# Patient Record
Sex: FEMALE | Race: WHITE
[De-identification: names, ages, dates, MRNs, and addresses within clinical notes are randomized per-mention and may not be internally consistent; named-entity substitution may affect disease eponyms.]

---

## 2017-08-07 NOTE — OR
DATE OF PROCEDURE:  08/07/2017

 

PREOPERATIVE DIAGNOSIS:  2 cm crown of scalp and 2.5 cm right occipital scalp 

masses consistent with cysts.

 

POSTOPERATIVE DIAGNOSIS:  2.5 cm crown of scalp and 3 cm right occipital scalp 

masses.

 

PROCEDURE:  Excision of 2.5 cm crown of scalp and 3 cm right occipital scalp 
masses.



SURGEON:  Yonny Sherman MD

 

ANESTHESIA:  IV anesthesia with monitored anesthesia care.

 

INDICATIONS:  This 36-year-old white female has 2 masses in her scalp.  One is 
up in 

the crown and the other in the right occipital area.  She is here to have them 
excised.  I

counseled her for excision of these two lesions including risks and alternatives
, and she

gave her informed consent to proceed.  These ultimately were shown to measure 
2.5 cm 

for  the crown and 3 cm for the right occipital area.

 

DESCRIPTION OF PROCEDURE:  The patient was placed prone on the operating room 

table.   IV anesthesia was administered by the Anesthesia Service.  Some small 
amount 

of hair was excised from both mass areas.  The areas were prepped and draped in 
the 

usual sterile fashion.  Time-out was held.  Lidocaine 1% with epinephrine in a 
50:50 mix 

with 0.5% Marcaine was infiltrated about the crown mass first.  An elliptical 
incision was 

then made to remove this.  It is consistent with a cyst.  It measured 2.5 cm in 
diameter.  

The incision was closed with a running stitch of 3-0 Prolene.  The specimen was 
sent to 

Pathology.

 

Next, the right occipital mass was excised.  Lidocaine 1% with epinephrine in a 
50:50 mix

with 0.5% Marcaine was infiltrated about this mass.  This mass was then excised 
using an

elliptical incision.  The mass was noted to measure 3 cm in diameter.  This was 
also sent 

to the laboratory.  The incision was closed with a running stitch of 3-0 
Prolene with an

occasional vertical mattress stitch placed to achieve good wound edge 
approximation.

Bacitracin was applied to both incisions.  She tolerated the procedure well and 
was brought

to recovery room in good condition.

 

 

 

 

Yonny Sherman MD

DD:  08/07/2017 10:08:40

DT:  08/07/2017 13:43:29

Job #:  [id number]

MTDD

## 2019-07-30 ENCOUNTER — HOSPITAL ENCOUNTER (INPATIENT)
Dept: HOSPITAL 11 - JP.OBCHECK | Age: 39
LOS: 3 days | Discharge: HOME | End: 2019-08-02
Attending: ADVANCED PRACTICE MIDWIFE | Admitting: ADVANCED PRACTICE MIDWIFE
Payer: MEDICAID

## 2019-07-30 DIAGNOSIS — Z67.41: ICD-10-CM

## 2019-07-30 DIAGNOSIS — Z88.0: ICD-10-CM

## 2019-07-30 DIAGNOSIS — Z3A.40: ICD-10-CM

## 2019-07-30 DIAGNOSIS — O32.2XX0: ICD-10-CM

## 2019-07-30 DIAGNOSIS — O26.893: ICD-10-CM

## 2019-07-30 PROCEDURE — 10907ZC DRAINAGE OF AMNIOTIC FLUID, THERAPEUTIC FROM PRODUCTS OF CONCEPTION, VIA NATURAL OR ARTIFICIAL OPENING: ICD-10-PCS | Performed by: ADVANCED PRACTICE MIDWIFE

## 2019-07-30 RX ADMIN — OXYCODONE HYDROCHLORIDE AND ACETAMINOPHEN PRN TAB: 5; 325 TABLET ORAL at 18:06

## 2019-07-30 RX ADMIN — FENTANYL CITRATE PRN MCG: 50 INJECTION, SOLUTION INTRAMUSCULAR; INTRAVENOUS at 19:29

## 2019-07-30 RX ADMIN — KETOROLAC TROMETHAMINE SCH MG: 30 INJECTION, SOLUTION INTRAMUSCULAR at 16:59

## 2019-07-30 RX ADMIN — FENTANYL CITRATE PRN MCG: 50 INJECTION, SOLUTION INTRAMUSCULAR; INTRAVENOUS at 23:34

## 2019-07-30 RX ADMIN — FENTANYL CITRATE PRN MCG: 50 INJECTION, SOLUTION INTRAMUSCULAR; INTRAVENOUS at 21:01

## 2019-07-30 NOTE — PCM.PNLD
Labor Progress Note





- VS & Meds


Vital Signs: 


 Last Vital Signs











Temp  97.8 F   07/30/19 10:05


 


Pulse  69   07/30/19 10:05


 


Resp  16   07/30/19 10:05


 


BP  131/84   07/30/19 10:05


 


Pulse Ox  97   07/30/19 10:05











Active Medications: 


 Current Medications





Acetaminophen (Tylenol)  650 mg PO Q4H PRN


   PRN Reason: Pain (Mild 1-3) and fever


Oxytocin/Sodium Chloride (Pitocin In Ns 20 Units/1,000 Ml)  20 unit in 1,000 

mls @ 999 mls/hr IV ASDIRECTED ANDREW; Protocol


Vancomycin HCl 1 gm/ Sodium (Chloride)  250 mls @ 166.667 mls/hr IV Q12H ANDREW


   Last Admin: 07/30/19 13:25 Dose:  166.667 mls/hr


Sodium Chloride (Saline Flush)  10 ml FLUSH ASDIRECTED PRN


   PRN Reason: Keep Vein Open





Discontinued Medications





Terbutaline Sulfate (Brethine) Confirm Administered Dose 1 mg .ROUTE .STK-MED 

ONE


   Stop: 07/30/19 15:02


Terbutaline Sulfate (Brethine)  0.4 mg SUBCUT ONETIME ONE


   Stop: 07/30/19 15:05











- Uterine Contractions


Uterine Monitoring Mode: External [place]


Contraction Frequency (min): 1-9


Contraction Duration (sec): 60-90


Contraction Intensity: Moderate


Uterine Resting Tone: Soft





- Fetal Monitoring


Fetal Heart Rate (FHR) Variability: Moderate (6-25 bmp)





- Vaginal Exam


Dilation (cm): 4-5


Effacement (Percent): 50


Station: 0


Cervical Position: Midposition


Sterile Vaginal Exam Performed By: Munira Pan


Vaginal Exam Comment: AROM, thick meconium and no head felt, US showed 

transverse





- Labor Progress (Free Text)


Labor Progress: 


Bulging bag in vaginal vault, Thick meconium, unable to feel head. US showed 

transverse lie. 


C section called, OR crew notified.

## 2019-07-30 NOTE — DISCH
SUMMARY OF HOSPITAL COURSE:  A 38-year-old female, who underwent a  section due to

breeching of the baby.  The patient did well postoperatively.  On postoperative day #1, her

pain is well controlled.  She had no nausea, vomiting, shortness of breath, or chest pain.

Tolerating diet.  The patient continued to advance at the time of discharge and is having

bowel movements.  The pain is well controlled.  She has no signs, symptoms, or

complications.

 

FOLLOWUP:  Surgery in 7 to 14 days.

 

ACTIVITY:  No lifting greater than 30 pounds x30 days.

 

DISCHARGE MEDICATIONS:  Please see MAR, but include Norco for pain.

## 2019-07-30 NOTE — PN
DATE OF SERVICE:  2019

 

SUBJECTIVE:  The patient doing very well today.  Pain is well controlled.  No nausea,

vomiting, shortness of breath, or chest pain.

 

OBJECTIVE:  VITAL SIGNS:  Stable.

CARDIOVASCULAR:  Regular rhythm and rate.

RESPIRATORY:  Lungs clear to auscultation bilaterally.

ABDOMEN:  Incision healing well.

 

ASSESSMENT:  Status post  section.

 

PLAN:  The patient will be discharged today.  Please see discharge summary for further

details.

 

 

 

 

Zuhair Rodrigues MD

DD:  2019 17:35:20

DT:  2019 17:50:55

Job #:  [id number]

## 2019-07-30 NOTE — US
OB Ltd 1 or More Fetus

 

CLINICAL HISTORY: Ruptured membranes, placental presentation

 

FINDINGS: Real-time transabdominal images are obtained through the pelvis. There

is a intrauterine pregnancy in transverse lie head to maternal right. The

placenta is lateral internal left and through the cervical os.

There is no significant fetal motion. Fetal heart rate is 150 bpm

 

 

IMPRESSION: Viable intrauterine pregnancy in transverse fetal lie with head

maternal right

 

Placenta left lateral inferior the cervical os

## 2019-07-30 NOTE — PCM.LDHP
L&D History of Present Illness





- General


Date of Service: 19 (labor)


Admit Problem/Dx: 


 Patient Status Order with Admit Dx/Problem





19 12:57


Patient Status [ADT] Routine 








 Admission Diagnosis/Problem











Admission Diagnosis/Problem    Pregnancy














Source of Information: Patient


History Limitations: Reports: No Limitations





- History of Present Illness


Introduction:: 


This 38 year old  presented in labor. Contractions started last night and 

slowly have gotten stronger , CE:4/50/0 anterior


GBS positive, PCN allergy and clindamycin resistive. 


ABO O neg, had Rhogam at 29 weeks


HIV negative





Timing/Duration: Reports: minutes: (3-5)


Location, Pregnancy: Reports: Abdomen


Quality: Reports: Pressure


Severity: Moderate


Improves with: Reports: Movement


Worsens with: Reports: None


Associated Symptoms: Reports: vaginal bleeding





- Related Data


Allergies/Adverse Reactions: 


 Allergies











Allergy/AdvReac Type Severity Reaction Status Date / Time


 


Penicillins Allergy Unknown Cannot Verified 17 11:07





   Remember  











Home Medications: 


 Home Meds





Pnv No.95/Ferrous Fum/Folic AC [Prenatal Caplet] 1 tab PO DAILY 19 [

History]











Past Medical History





- Past Health History


Medical/Surgical History: Denies Medical/Surgical History


HEENT History: Reports: Impaired Vision


OB/GYN History: Reports: Pregnancy (NARCISO 19), Spontaneous , Other (

See Below)


: 7


Para: 4


LMP (Approximate): Pregnant


Other OB/BYN History: LAPEROSCOPY TO SEE IF SHE HAD ENOMETROSIS.





- Infectious Disease History


Infectious Disease History: Reports: Chicken Pox





- Past Surgical History


HEENT Surgical History: Reports: None


GI Surgical History: Reports: Colonoscopy, EGD, Hernia, Abdominal


Female  Surgical History: Reports: D&C





Social & Family History





- Family History


Family Medical History: Noncontributory





- Caffeine Use


Caffeine Use: Reports: None


Other Caffeine Use: occassionally





H&P Review of Systems





- Review of Systems:


Review Of Systems: See Below


General: Reports: No Symptoms


HEENT: Reports: No Symptoms


Pulmonary: Reports: No Symptoms


Cardiovascular: Reports: No Symptoms


Gastrointestinal: Reports: No Symptoms


Genitourinary: Reports: No Symptoms


Musculoskeletal: Reports: No Symptoms


Skin: Reports: No Symptoms


Psychiatric: Reports: No Symptoms


Neurological: Reports: No Symptoms


Hematologic/Lymphatic: Reports: No Symptoms


Immunologic: Reports: No Symptoms





L&D Exam





- Exam


Exam: See Below





- Vital Signs


Vital Signs: 


 Last Vital Signs











Temp  97.8 F   19 10:05


 


Pulse  69   19 10:05


 


Resp  16   19 10:05


 


BP  131/84   19 10:05


 


Pulse Ox  97   19 10:05











Weight: 198 lb





- OB Specific


Contraction Duration (sec): 


Contraction Frequency (min): 5


Contraction Intensity: Moderate


Fetal Movement: Active


Fetal Heart Tones: Present


Fetal Heart Rate (FHR) Variability: Moderate (6-25 bmp)


Birth Presentation: Vertex


Estimated Fetal Weight: 8 pounds





- Akers Score


Akers Score Cervix Position: Anterior


Akers Score Consistency: Soft


Akers Score Effacement: 51-70%


Akers Score Dilation: 3-4 cm


Akers Score Infant's Station: -1 ,0


Akers Score Total: 10





- Exam


General: Alert, Oriented


HEENT: PERRLA, Conjunctiva Clear, Mucosa Moist & Pink, Pupils Equal


Neck: Supple


Lungs: Normal Respiratory Effort


Cardiovascular: Regular Rate, Regular Rhythm


GI/Abdominal Exam: Soft, No Mass


Rectal Exam: Normal Exam


Genitourinary: Cervical dilitation, Enlarged uterus, Other (large labial 

varicosities)


Back Exam: Normal Inspection


Extremities: Normal Inspection, Normal Range of Motion


Skin: Warm, Dry


Neurological: Cranial Nerves Intact, Reflexes Equal Bilateral


Psychiatric: Alert, Normal Affect, Normal Mood





- Patient Data


Lab Results Last 24 hrs: 


 Laboratory Results - last 24 hr











  19 Range/Units





  09:18 


 


Urine Color  Yellow  


 


Urine Appearance  Slightly cloudy  


 


Urine pH  7.0  (4.5-8.0)  


 


Ur Specific Gravity  1.005 L  (1.008-1.030)  


 


Urine Protein  Negative  (NEGATIVE)  mg/dL


 


Urine Glucose (UA)  Normal  (NEGATIVE)  mg/dL


 


Urine Ketones  Negative  (NEGATIVE)  mg/dL


 


Urine Occult Blood  Negative  (NEGATIVE)  


 


Urine Nitrite  Negative  (NEGATIVE)  


 


Urine Bilirubin  Negative  (NEGATIVE)  


 


Urine Urobilinogen  Normal  (NORMAL)  mg/dL


 


Ur Leukocyte Esterase  Small  (NEGATIVE)  


 


Urine WBC  0-5  (0-5)  


 


Ur Epithelial Cells  Few  


 


Amorphous Sediment  Not seen  


 


Urine Bacteria  Moderate  


 


Urine Mucus  Not seen  














- Problem List


(1) GBS (group B Streptococcus carrier), +RV culture, currently pregnant


SNOMED Code(s): 2200698920139, 479484639, 9245414724704


   ICD Code: O99.820 - STREPTOCOCCUS B CARRIER STATE COMPLICATING PREGNANCY   

Status: Acute   Current Visit: Yes   





(2) Labor established


SNOMED Code(s): 26604595


   ICD Code: BHX1978 -    Status: Acute   Current Visit: Yes   





(3) Pregnancy


SNOMED Code(s): 97047243


   ICD Code: Z34.90 - ENCNTR FOR SUPRVSN OF NORMAL PREGNANCY, UNSP, UNSP 

TRIMESTER   Status: Acute   Current Visit: Yes   


Qualifiers: 


   Weeks of gestation: 40 weeks   Qualified Code(s): Z3A.40 - 40 weeks 

gestation of pregnancy   


Problem List Initiated/Reviewed/Updated: Yes


Orders Last 24hrs: 


 Active Orders 24 hr











 Category Date Time Status


 


 Patient Status [ADT] Routine ADT  19 12:57 Ordered


 


 Antiembolic Devices [RC] .Routine Care  19 13:01 Ordered


 


 Communication Order [RC] ASDIRECTED Care  19 12:57 Ordered


 


 Communication Order [RC] Per Unit Routine Care  19 13:02 Ordered


 


 Communication Order [RC] Per Unit Routine Care  19 13:02 Ordered


 


 Communication Order [RC] Per Unit Routine Care  19 13:02 Ordered


 


 Fetal Heart Tones [RC] PER UNIT ROUTINE Care  19 12:57 Ordered


 


 Fetal Non Stress Test [RC] Click to Edit Care  19 12:57 Ordered


 


 May Shower [RC] ASDIRECTED Care  19 12:57 Ordered


 


 Nitrous Oxide Delivery [RC] ASDIRECTED Care  19 13:02 Ordered


 


 Notify Provider Vital Signs [RC] PRN Care  19 12:57 Ordered


 


 Notify Provider [RC] PRN Care  19 12:57 Ordered


 


 OB Check [OM.PC] Click to Edit Care  19 09:12 Ordered


 


 Oxygen Therapy [RC] ASDIRECTED Care  19 13:02 Ordered


 


 Pulse Oximetry [RC] ASDIRECTED Care  19 13:02 Ordered


 


 Up ad [place] [RC] ASDIRECTED Care  19 12:57 Ordered


 


 VTE/DVT Education [RC] Click to Edit Care  19 13:01 Ordered


 


 Verify Patient Consent Obtain [RC] ASDIRECTED Care  19 13:02 Ordered


 


 Vital Signs [RC] PER UNIT ROUTINE Care  19 12:57 Ordered


 


 Vital Signs [RC] PER UNIT ROUTINE Care  19 13:02 Ordered


 


 Regular Diet [DIET] Diet  19 Dinner Ordered


 


 CBC W/O DIFF,HEMOGRAM [HEME] Routine Lab  19 12:57 Ordered


 


 Acetaminophen [Tylenol] Med  19 12:57 Ordered





 650 mg PO Q4H PRN   


 


 Oxytocin/Normal Saline [Pitocin in NS 20 Units/1,000 ML Med  19 13:02 

Ordered





 ]   





 20 unit in 1,000 ml IV ONETIME   


 


 Sodium Chloride 0.9% [Saline Flush] Med  19 12:57 Ordered





 10 ml FLUSH ASDIRECTED PRN   


 


 DVT/VTE Prophylaxis Reflex [OM.PC] Routine Oth  19 12:57 Ordered


 


 Saline Lock Insert [OM.PC] Routine Oth  19 12:57 Ordered


 


 Resuscitation Status Routine Resus Stat  19 12:57 Ordered








 Medication Orders





Acetaminophen (Tylenol)  650 mg PO Q4H PRN


   PRN Reason: Pain (Mild 1-3) and fever


Oxytocin/Sodium Chloride (Pitocin In Ns 20 Units/1,000 Ml)  20 unit in 1,000 

mls @ 999 mls/hr IV ONETIME ONE; Protocol


   Stop: 19 14:02


Sodium Chloride (Saline Flush)  10 ml FLUSH ASDIRECTED PRN


   PRN Reason: Keep Vein Open








Assessment/Plan Comment:: 





19


38 yr old  40 1/, labor


gbs positive, treating


wants nitrous for pain management





Plan for vaginal delivery later today

## 2019-07-31 PROCEDURE — 3E0234Z INTRODUCTION OF SERUM, TOXOID AND VACCINE INTO MUSCLE, PERCUTANEOUS APPROACH: ICD-10-PCS | Performed by: ADVANCED PRACTICE MIDWIFE

## 2019-07-31 RX ADMIN — KETOROLAC TROMETHAMINE SCH MG: 30 INJECTION, SOLUTION INTRAMUSCULAR at 00:39

## 2019-07-31 RX ADMIN — FENTANYL CITRATE PRN MCG: 50 INJECTION, SOLUTION INTRAMUSCULAR; INTRAVENOUS at 13:21

## 2019-07-31 RX ADMIN — OXYCODONE HYDROCHLORIDE AND ACETAMINOPHEN PRN TAB: 5; 325 TABLET ORAL at 08:42

## 2019-07-31 RX ADMIN — OXYCODONE HYDROCHLORIDE AND ACETAMINOPHEN PRN TAB: 5; 325 TABLET ORAL at 21:41

## 2019-07-31 RX ADMIN — OXYCODONE HYDROCHLORIDE AND ACETAMINOPHEN PRN TAB: 5; 325 TABLET ORAL at 23:47

## 2019-07-31 RX ADMIN — FENTANYL CITRATE PRN MCG: 50 INJECTION, SOLUTION INTRAMUSCULAR; INTRAVENOUS at 15:11

## 2019-07-31 RX ADMIN — FENTANYL CITRATE PRN MCG: 50 INJECTION, SOLUTION INTRAMUSCULAR; INTRAVENOUS at 05:17

## 2019-07-31 RX ADMIN — VITAMIN A, ASCORBIC ACID, CHOLECALCIFEROL, .ALPHA.-TOCOPHEROL ACETATE, DL-, THIAMINE MONONITRATE, RIBOFLAVIN, NIACINAMIDE, PYRIDOXINE HYDROCHLORIDE, FOLIC ACID, CYANOCOBALAMIN, CALCIUM CARBONATE, IRON, ZINC OXIDE, AND CUPRIC OXIDE SCH EACH: 4000; 120; 400; 22; 1.84; 3; 20; 10; 1; 12; 200; 29; 25; 2 TABLET ORAL at 08:43

## 2019-07-31 RX ADMIN — OXYCODONE HYDROCHLORIDE AND ACETAMINOPHEN PRN TAB: 5; 325 TABLET ORAL at 12:41

## 2019-07-31 RX ADMIN — FENTANYL CITRATE PRN MCG: 50 INJECTION, SOLUTION INTRAMUSCULAR; INTRAVENOUS at 21:47

## 2019-07-31 RX ADMIN — OXYCODONE HYDROCHLORIDE AND ACETAMINOPHEN PRN TAB: 5; 325 TABLET ORAL at 19:13

## 2019-07-31 RX ADMIN — FENTANYL CITRATE PRN MCG: 50 INJECTION, SOLUTION INTRAMUSCULAR; INTRAVENOUS at 07:50

## 2019-07-31 RX ADMIN — KETOROLAC TROMETHAMINE SCH MG: 30 INJECTION, SOLUTION INTRAMUSCULAR at 08:43

## 2019-07-31 RX ADMIN — OXYCODONE HYDROCHLORIDE AND ACETAMINOPHEN PRN TAB: 5; 325 TABLET ORAL at 17:19

## 2019-07-31 RX ADMIN — FENTANYL CITRATE PRN MCG: 50 INJECTION, SOLUTION INTRAMUSCULAR; INTRAVENOUS at 02:56

## 2019-07-31 NOTE — PN
DATE OF SERVICE:  2019

 

SUBJECTIVE:  The patient is doing well today.  Pain is still an issue but improving.  No

nausea, vomiting, shortness of breath, or chest pain.  Tolerating diet.

 

OBJECTIVE:  VITAL SIGNS:  Stable.

CARDIOVASCULAR:  Regular rhythm and rate.

RESPIRATORY:  Lungs are clear to auscultation bilaterally.

ABDOMEN:  Incision is healing well.

 

ASSESSMENT:  Status post  section.

 

PLAN:  The patient will work on activity and diet today.  We will change her pain management

plan to disperse her Percocet at more even rate.  As far as her hemoglobin, was lowered due

to the ; however, she is not having any vaginal bleeding at this time and is doing

quite well.

 

 

 

 

Zuhair Rodrigues MD

DD:  2019 08:44:51

DT:  2019 10:31:00

Job #:  [id number]

## 2019-08-01 RX ADMIN — OXYCODONE HYDROCHLORIDE AND ACETAMINOPHEN PRN TAB: 5; 325 TABLET ORAL at 13:19

## 2019-08-01 RX ADMIN — VITAMIN A, ASCORBIC ACID, CHOLECALCIFEROL, .ALPHA.-TOCOPHEROL ACETATE, DL-, THIAMINE MONONITRATE, RIBOFLAVIN, NIACINAMIDE, PYRIDOXINE HYDROCHLORIDE, FOLIC ACID, CYANOCOBALAMIN, CALCIUM CARBONATE, IRON, ZINC OXIDE, AND CUPRIC OXIDE SCH EACH: 4000; 120; 400; 22; 1.84; 3; 20; 10; 1; 12; 200; 29; 25; 2 TABLET ORAL at 08:07

## 2019-08-01 RX ADMIN — OXYCODONE HYDROCHLORIDE AND ACETAMINOPHEN PRN TAB: 5; 325 TABLET ORAL at 08:07

## 2019-08-01 RX ADMIN — MAGNESIUM HYDROXIDE PRN ML: 400 SUSPENSION ORAL at 13:40

## 2019-08-01 RX ADMIN — OXYCODONE HYDROCHLORIDE AND ACETAMINOPHEN PRN TAB: 5; 325 TABLET ORAL at 05:59

## 2019-08-01 RX ADMIN — OXYCODONE HYDROCHLORIDE AND ACETAMINOPHEN PRN TAB: 5; 325 TABLET ORAL at 10:21

## 2019-08-01 RX ADMIN — OXYCODONE HYDROCHLORIDE AND ACETAMINOPHEN PRN TAB: 5; 325 TABLET ORAL at 03:59

## 2019-08-01 RX ADMIN — OXYCODONE HYDROCHLORIDE AND ACETAMINOPHEN PRN TAB: 5; 325 TABLET ORAL at 21:34

## 2019-08-01 RX ADMIN — OXYCODONE HYDROCHLORIDE AND ACETAMINOPHEN PRN TAB: 5; 325 TABLET ORAL at 15:28

## 2019-08-01 RX ADMIN — OXYCODONE HYDROCHLORIDE AND ACETAMINOPHEN PRN TAB: 5; 325 TABLET ORAL at 01:43

## 2019-08-01 RX ADMIN — OXYCODONE HYDROCHLORIDE AND ACETAMINOPHEN PRN TAB: 5; 325 TABLET ORAL at 17:22

## 2019-08-01 NOTE — PN
DATE OF SERVICE:  2019

 

SUBJECTIVE:  The patient continues to improve.  Pain is improved.  No nausea, vomiting,

shortness of breath, or chest pain.

 

OBJECTIVE:  VITAL SIGNS:  Stable.

CARDIOVASCULAR:  Regular rhythm and rate.

RESPIRATORY:  Lungs are clear to auscultation bilaterally.

ABDOMEN:  Incision is healing well.

 

ASSESSMENT:  Status post  section.

 

PLAN:  We will increase diet and activity today and anticipate discharge in the next 24

hours.  No vaginal bleeding reported.  Continue on with same plan.

 

 

 

 

Zuhair Rodrigues MD

DD:  2019 09:17:18

DT:  2019 09:40:55

Job #:  [id number]

## 2019-08-02 VITALS — DIASTOLIC BLOOD PRESSURE: 57 MMHG | SYSTOLIC BLOOD PRESSURE: 110 MMHG | HEART RATE: 83 BPM

## 2019-08-02 RX ADMIN — OXYCODONE HYDROCHLORIDE AND ACETAMINOPHEN PRN TAB: 5; 325 TABLET ORAL at 07:49

## 2019-08-02 RX ADMIN — OXYCODONE HYDROCHLORIDE AND ACETAMINOPHEN PRN TAB: 5; 325 TABLET ORAL at 05:11

## 2019-08-02 RX ADMIN — MAGNESIUM HYDROXIDE PRN ML: 400 SUSPENSION ORAL at 05:11

## 2019-08-02 RX ADMIN — OXYCODONE HYDROCHLORIDE AND ACETAMINOPHEN PRN TAB: 5; 325 TABLET ORAL at 02:36

## 2019-08-02 RX ADMIN — VITAMIN A, ASCORBIC ACID, CHOLECALCIFEROL, .ALPHA.-TOCOPHEROL ACETATE, DL-, THIAMINE MONONITRATE, RIBOFLAVIN, NIACINAMIDE, PYRIDOXINE HYDROCHLORIDE, FOLIC ACID, CYANOCOBALAMIN, CALCIUM CARBONATE, IRON, ZINC OXIDE, AND CUPRIC OXIDE SCH: 4000; 120; 400; 22; 1.84; 3; 20; 10; 1; 12; 200; 29; 25; 2 TABLET ORAL at 11:01

## 2019-08-02 RX ADMIN — OXYCODONE HYDROCHLORIDE AND ACETAMINOPHEN PRN TAB: 5; 325 TABLET ORAL at 00:24

## 2019-08-02 NOTE — PN
DATE OF SERVICE:  2019

 

SUBJECTIVE:  The patient is doing very well.  Pain is well controlled.  No nausea, vomiting,

shortness of breath, or chest pain.

 

OBJECTIVE:  VITAL SIGNS:  Stable.

CARDIOVASCULAR:  Regular rate.

RESPIRATORY:  Lungs are clear to auscultation bilaterally.

ABDOMEN:  Bowel sounds positive.  Incision healing well.

 

ASSESSMENT:  Status post  section.

 

PLAN:  The patient will be discharged today.  Please see discharge summary for further

details.

 

 

 

 

Zuhair Rodrigues MD

DD:  2019 08:37:43

DT:  2019 08:50:41

Job #:  [id number]

## 2019-08-02 NOTE — DISCH
SUMMARY OF HOSPITAL COURSE:  This is a pleasant 38-year-old female who underwent an emergent

 due to transposition of the baby __________.  She did quite well postoperatively.

Her pain continues to be an issue, but, however, it is controlled with Percocet.

 

FOLLOWUP:  With Surgery in 7 to 14 days.

 

CONSULTATIONS:  Additional consultations during this hospitalization, none.

 

COMPLICATIONS:  None.

 

DISCHARGE MEDICATIONS:  Please see MAR, but include ibuprofen and Percocet for pain.

## 2019-08-03 ENCOUNTER — HOSPITAL ENCOUNTER (EMERGENCY)
Dept: HOSPITAL 11 - JP.ED | Age: 39
LOS: 1 days | Discharge: HOME | End: 2019-08-04
Payer: MEDICAID

## 2019-08-03 DIAGNOSIS — O99.89: Primary | ICD-10-CM

## 2019-08-03 DIAGNOSIS — Z88.0: ICD-10-CM

## 2019-08-03 DIAGNOSIS — R10.30: ICD-10-CM

## 2019-08-03 DIAGNOSIS — Z79.899: ICD-10-CM

## 2019-08-04 VITALS — SYSTOLIC BLOOD PRESSURE: 148 MMHG | DIASTOLIC BLOOD PRESSURE: 81 MMHG | HEART RATE: 93 BPM

## 2019-08-04 NOTE — EDM.PDOC
ED HPI GENERAL MEDICAL PROBLEM





- General


Chief Complaint: Abdominal Pain


Stated Complaint: FEVER,ABDOMINAL PAIN


Time Seen by Provider: 19 00:20


Source of Information: Reports: Patient


History Limitations: Reports: No Limitations





- History of Present Illness


INITIAL COMMENTS - FREE TEXT/NARRATIVE: 





38-year-old female underwent a  3 days ago, just got discharged from 

the hospital and yesterday felt better but today was having increased abdominal 

pain. She took her temperature and felt she was running a low-grade fever so 

called the OB department and they recommended she get checked out. She also has 

pain when her bladder is full, its her relief and decreased pain to empty her 

bladder. She has no inflammation or redness of the  incision.


Onset: Unknown/Unsure


Associated Symptoms: Denies: Confusion, Chest Pain, Cough, Malaise


Treatments PTA: Reports: NSAIDS


  ** Bilateral Middle Abdomen


Pain Score (Numeric/FACES): 4





- Related Data


 Allergies











Allergy/AdvReac Type Severity Reaction Status Date / Time


 


Penicillins Allergy Unknown Cannot Verified 17 11:07





   Remember  











Home Meds: 


 Home Meds





Pnv No.95/Ferrous Fum/Folic AC [Prenatal Caplet] 1 tab PO DAILY 19 [

History]


oxyCODONE HCl/Acetaminophen [Percocet  mg Tablet] 1 each PO DAILY  [History]











Past Medical History





- Past Health History


Medical/Surgical History: Denies Medical/Surgical History


HEENT History: Reports: Impaired Vision


OB/GYN History: Reports: Pregnancy, Spontaneous , Other (See Below)


Other OB/GYN History: LAPEROSCOPY TO SEE IF SHE HAD ENOMETROSIS.





- Infectious Disease History


Infectious Disease History: Reports: Chicken Pox





- Past Surgical History


HEENT Surgical History: Reports: None


GI Surgical History: Reports: Colonoscopy, EGD, Hernia, Abdominal


Other GI Surgeries/Procedures: exploratory laprascopy


Female  Surgical History: Reports:  Section, D&C





Social & Family History





- Family History


Family Medical History: Noncontributory





- Tobacco Use


Smoking Status *Q: Never Smoker





- Caffeine Use


Caffeine Use: Reports: None


Other Caffeine Use: occassionally





- Recreational Drug Use


Recreational Drug Use: No





ED ROS GENERAL





- Review of Systems


Review Of Systems: See Below


Constitutional: Reports: Fever, Chills, Malaise


HEENT: Reports: No Symptoms


Respiratory: Denies: Shortness of Breath


Cardiovascular: Denies: Chest Pain


GI/Abdominal: Reports: Abdominal Pain (Especially lower abdomen)


: Reports: Other (Pain with full bladder)


Skin: Reports: No Symptoms





ED EXAM, GI/ABD





- Physical Exam


Exam: See Below


Exam Limited By: Intoxication


General Appearance: Alert, No Apparent Distress


Eyes: Bilateral: Normal Appearance


Respiratory/Chest: No Respiratory Distress, Lungs Clear


Cardiovascular: Regular Rate, Rhythm


Extremities: Normal Inspection





Course





- Vital Signs


Last Recorded V/S: 


 Last Vital Signs











Temp  98.0 F   19 00:12


 


Pulse  93   19 00:12


 


Resp  16   19 00:12


 


BP  148/81 H  19 00:12


 


Pulse Ox  96   19 00:12














- Orders/Labs/Meds


Labs: 


 Laboratory Tests











  19 Range/Units





  00:20 


 


Urine Color  Yellow  


 


Urine Appearance  Clear  


 


Urine pH  8.0  (4.5-8.0)  


 


Ur Specific Gravity  1.010  (1.008-1.030)  


 


Urine Protein  Negative  (NEGATIVE)  mg/dL


 


Urine Glucose (UA)  Normal  (NEGATIVE)  mg/dL


 


Urine Ketones  Negative  (NEGATIVE)  mg/dL


 


Urine Occult Blood  Trace  (NEGATIVE)  


 


Urine Nitrite  Negative  (NEGATIVE)  


 


Urine Bilirubin  Negative  (NEGATIVE)  


 


Urine Urobilinogen  Normal  (NORMAL)  mg/dL


 


Ur Leukocyte Esterase  Negative  (NEGATIVE)  


 


Urine RBC  0-5  (0-5)  


 


Urine WBC  0-5  (0-5)  


 


Ur Epithelial Cells  Rare  


 


Amorphous Sediment  Not seen  


 


Urine Bacteria  Rare  


 


Urine Mucus  Not seen  














- Re-Assessments/Exams


Free Text/Narrative Re-Assessment/Exam: 





19 00:52


A UA was obtained by clean catch and was totally normal. Patient was afebrile 

and fairly comfortable at this time so no further workup is needed. It's likely 

too early for a postsurgical infection or abscess. It's more likely she was 

just too active yesterday and is struggling with some discomfort from activity. 

She should continue her current medications and return if she develops a fever 

or increased pain, redness or drainage from her incision.





Departure





- Departure


Time of Disposition: 00:57


Disposition: Home, Self-Care 01


Condition: Good


Clinical Impression: 


Abdominal pain


Qualifiers:


 Abdominal location: lower abdomen, unspecified Qualified Code(s): R10.30 - 

Lower abdominal pain, unspecified








- Discharge Information


Instructions:  Abdominal Pain, Adult, Easy-to-Read


Referrals: 


Munira Pan CNM [Primary Care Provider] - 


Forms:  ED Department Discharge


Care Plan Goals: 


Continue your pain medications, increase activity as tolerated and return if 

you develop or persistent fever, redness such her surgical incision or drainage

, or nausea and vomiting.

## 2019-08-16 NOTE — OR
DATE OF PROCEDURE:  2019

 

SURGEON:  Zuhair Rodrigues MD

 

PROCEDURE:   section.

 

PREOPERATIVE DIAGNOSIS:  Requirement for emergent .

 

POSTOPERATIVE DIAGNOSIS:  Requirement for emergent .

 

ASSISTANT:  Munira Pan CNM.

 

ANESTHESIA:  Spinal.

 

RISKS:  Risks, benefits, alternatives, and limitations including, but not limited to

infection; bleeding; injury to baby, bowel, or bladder; and other risks not listed here.

 

PROCEDURE IN DETAIL:  The patient was placed in supine position.  A Pfannenstiel incision

was made approximately 2 fingerbreadths above the pubic symphysis.  This was carried down

with electrocautery, down to the fascia which was opened also with electrocautery.

Metzenbaum scissors were used to open the perineum and a muscle-sparing technique was used.

The bladder was identified and deflected anteriorly.  This was protected with a bladder

blade during the remainder of the surgery.

 

A Edwina clamp was used to dissect the uterus.  This was then opened for the bandage

scissors.  The baby was delivered without difficulty.

 

Pitocin was given.

 

The placenta was delivered without difficulty.

 

The uterus was closed with 3 layers of running #1 Vicryl suture in a locking fashion after

the uterus was inspected for any abnormalities.  This was then placed back into the abdomen

after irrigation.

 

The rectus muscles were reapproximated.  The fascia was then closed with #1 Vicryl in

running suture x2.  This was also irrigated.  Subcutaneous tissues were approximated.  Skin

was closed with 4-0 Prolene and Dermabond was applied.  The patient tolerated the procedure

well.

 

 

 

 

Zuhair Rodrigues MD

DD:  2019 09:28:56

DT:  2019 12:08:40

Job #:  [id number]

## 2020-10-26 ENCOUNTER — HOSPITAL ENCOUNTER (OUTPATIENT)
Dept: HOSPITAL 11 - JP.SDS | Age: 40
Discharge: HOME | End: 2020-10-26
Attending: SURGERY
Payer: MEDICAID

## 2020-10-26 VITALS — SYSTOLIC BLOOD PRESSURE: 99 MMHG | DIASTOLIC BLOOD PRESSURE: 67 MMHG

## 2020-10-26 VITALS — HEART RATE: 57 BPM

## 2020-10-26 DIAGNOSIS — Z88.0: ICD-10-CM

## 2020-10-26 DIAGNOSIS — I87.2: Primary | ICD-10-CM

## 2020-10-26 PROCEDURE — 81025 URINE PREGNANCY TEST: CPT

## 2020-10-26 PROCEDURE — 36475 ENDOVENOUS RF 1ST VEIN: CPT

## 2020-10-26 PROCEDURE — 36470 NJX SCLRSNT 1 INCMPTNT VEIN: CPT

## 2020-10-27 NOTE — OR
DATE OF PROCEDURE:  10/26/2020

 

SURGEON:  Zuhair Rodrigues MD

 

PROCEDURE:

1. Radiofrequency ablation of left anterior accessory vein.

2. Radiofrequency ablation of right greater saphenous vein.

3. Sclerotherapy of left leg, multiple.

4. Sclerotherapy of right leg, multiple.

5. Compression wrap, left leg ([id number]).

6. Compression wrap, right leg ([id number]).

 

COMPLICATIONS:  None.

 

ASSISTANT:  None.

 

ANESTHETIC:  MAC.

 

RISKS:  Risks, benefits, alternatives, and limitations including, but not limited to,

infection, bleeding, and DVT formation.

 

PREOPERATIVE DIAGNOSIS:  Venous insufficiency with inflammation and pain.

 

POSTOPERATIVE DIAGNOSIS:  Venous insufficiency with inflammation and pain.

 

DESCRIPTION OF PROCEDURE:  The patient was placed in supine position.  The left anterior

accessory vein was accessed at the most distal location.  This was anesthetized with 1%

lidocaine.  Introduced and advanced a 21-gauge needle and exchanged for a 35,000 sheath.

The RFA probe was advanced to the most proximal area, which was greater than 3 cm from the

saphenofemoral junction.  Tumescent fluid injected in 1 cm jacket around this and verified a

second and a third time.  Direct even pressure was held as the sheath was deployed x2

proximally and distally and x1 in all other segments.  The sheath and device were then

removed, and Dermabond was applied.

 

The right leg was then performed in same manner, same fashion, same technique, and the same

sequence using the same equipment except for the greater saphenous vein.

 

Dermabond was applied for both.

 

Sclerotherapy was then performed in left and right legs using 0.33% sodium tetradecyl.  This

was always drawn back to ensure intravascular injection only.  No more than 2 mL was

injected in 1 location.  Two-layer, 2-stage compression wrap was performed in a figure-of-

eight in a [id number] gradient.  The patient tolerated the procedure well.

 

 

 

 

Zuhair Rodrigues MD

DD:  10/26/2020 10:55:43

DT:  10/26/2020 12:18:00

Job #:  [id number]